# Patient Record
Sex: FEMALE | Race: BLACK OR AFRICAN AMERICAN | NOT HISPANIC OR LATINO | Employment: FULL TIME | URBAN - METROPOLITAN AREA
[De-identification: names, ages, dates, MRNs, and addresses within clinical notes are randomized per-mention and may not be internally consistent; named-entity substitution may affect disease eponyms.]

---

## 2018-06-06 ENCOUNTER — CLINICAL SUPPORT (OUTPATIENT)
Dept: OCCUPATIONAL MEDICINE | Facility: CLINIC | Age: 33
End: 2018-06-06

## 2018-06-06 DIAGNOSIS — Z02.1 PRE-EMPLOYMENT DRUG SCREENING: Primary | ICD-10-CM

## 2018-06-06 LAB
CTP QC/QA: YES
POC 5 PANEL DRUG SCREEN: NEGATIVE

## 2018-06-06 PROCEDURE — 86580 TB INTRADERMAL TEST: CPT | Mod: S$GLB,,, | Performed by: FAMILY MEDICINE

## 2018-06-06 PROCEDURE — 80305 DRUG TEST PRSMV DIR OPT OBS: CPT | Mod: QW,S$GLB,, | Performed by: NURSE PRACTITIONER

## 2018-06-08 LAB
TB INDURATION - 48 HR READ: 0 MM
TB INDURATION - 72 HR READ: NORMAL MM
TB SKIN TEST - 48 HR READ: NEGATIVE
TB SKIN TEST - 72 HR READ: NORMAL

## 2018-08-27 PROBLEM — D57.3 SICKLE CELL TRAIT: Status: ACTIVE | Noted: 2018-08-27

## 2018-10-02 PROBLEM — Z87.51 HISTORY OF PRETERM DELIVERY: Status: ACTIVE | Noted: 2018-10-02

## 2018-10-02 PROBLEM — O99.331 PREGNANCY COMPLICATED BY TOBACCO USE IN FIRST TRIMESTER: Status: ACTIVE | Noted: 2018-10-02

## 2024-05-13 ENCOUNTER — OCCUPATIONAL HEALTH (OUTPATIENT)
Dept: URGENT CARE | Facility: CLINIC | Age: 39
End: 2024-05-13

## 2024-05-13 PROCEDURE — 80305 DRUG TEST PRSMV DIR OPT OBS: CPT | Mod: S$GLB,,, | Performed by: EMERGENCY MEDICINE

## 2024-05-13 NOTE — PROGRESS NOTES
ECUP-MARY     Detail Level: Simple Has The Patient Been Infected With Covid-19 In The Past?: No Previous Infection Text: The patient has recovered from a previous COVID-19 infection.